# Patient Record
Sex: MALE | Race: WHITE
[De-identification: names, ages, dates, MRNs, and addresses within clinical notes are randomized per-mention and may not be internally consistent; named-entity substitution may affect disease eponyms.]

---

## 2019-06-14 ENCOUNTER — HOSPITAL ENCOUNTER (OUTPATIENT)
Dept: HOSPITAL 19 - SDCO | Age: 59
Discharge: HOME | End: 2019-06-14
Attending: SURGERY
Payer: COMMERCIAL

## 2019-06-14 VITALS — HEART RATE: 80 BPM | DIASTOLIC BLOOD PRESSURE: 90 MMHG | SYSTOLIC BLOOD PRESSURE: 138 MMHG | TEMPERATURE: 98 F

## 2019-06-14 VITALS — DIASTOLIC BLOOD PRESSURE: 94 MMHG | TEMPERATURE: 97.6 F | HEART RATE: 64 BPM | SYSTOLIC BLOOD PRESSURE: 137 MMHG

## 2019-06-14 VITALS — HEART RATE: 68 BPM | SYSTOLIC BLOOD PRESSURE: 134 MMHG | DIASTOLIC BLOOD PRESSURE: 79 MMHG

## 2019-06-14 VITALS — DIASTOLIC BLOOD PRESSURE: 91 MMHG | HEART RATE: 77 BPM | SYSTOLIC BLOOD PRESSURE: 136 MMHG

## 2019-06-14 VITALS — TEMPERATURE: 97.5 F | HEART RATE: 114 BPM | SYSTOLIC BLOOD PRESSURE: 154 MMHG | DIASTOLIC BLOOD PRESSURE: 89 MMHG

## 2019-06-14 VITALS — BODY MASS INDEX: 27.08 KG/M2 | WEIGHT: 210.98 LBS | HEIGHT: 74.02 IN

## 2019-06-14 DIAGNOSIS — G89.29: ICD-10-CM

## 2019-06-14 DIAGNOSIS — K40.90: Primary | ICD-10-CM

## 2019-06-14 DIAGNOSIS — M54.5: ICD-10-CM

## 2019-06-14 DIAGNOSIS — Z88.0: ICD-10-CM

## 2019-06-14 PROCEDURE — C1781 MESH (IMPLANTABLE): HCPCS

## 2019-06-14 NOTE — NUR
RETURNS FROM PACU VIA CART TO BAY 1. ALERT AND ORIENTED. DENIES PAIN AT THIS
TIME. FRIEND AT BEDSIDE. REQUESTS MUFFIN AND JUICE. POST OP VS STARTED. CALL
LIGHT WITHIN REACH. WILL CONTINUE TO MONITOR.

## 2019-06-14 NOTE — NUR
DISCHARGE INSTRUCTIONS GIVEN TO PATIENT AND WIFE. VERBALIZED UNDERSTANDING.
AMBULATED WITH STEADY GAIT TO PERSONAL VEHICHLE WITH WIFE.